# Patient Record
Sex: MALE | Race: WHITE | Employment: OTHER | URBAN - METROPOLITAN AREA
[De-identification: names, ages, dates, MRNs, and addresses within clinical notes are randomized per-mention and may not be internally consistent; named-entity substitution may affect disease eponyms.]

---

## 2017-09-08 PROBLEM — N52.9 ERECTILE DYSFUNCTION: Status: ACTIVE | Noted: 2017-09-08

## 2017-09-08 PROBLEM — R35.0 FREQUENCY OF URINATION: Status: ACTIVE | Noted: 2017-09-08

## 2020-10-15 ENCOUNTER — HOSPITAL ENCOUNTER (OUTPATIENT)
Age: 75
Setting detail: SPECIMEN
Discharge: HOME | End: 2020-10-15
Payer: MEDICARE

## 2020-10-15 LAB
ANION GAP SERPL CALCULATED.3IONS-SCNC: 10 MMOL/L (ref 9–17)
BUN BLDV-MCNC: 22 MG/DL (ref 8–23)
BUN/CREAT BLD: ABNORMAL (ref 9–20)
CALCIUM SERPL-MCNC: 8.3 MG/DL (ref 8.6–10.4)
CHLORIDE BLD-SCNC: 113 MMOL/L (ref 98–107)
CO2: 22 MMOL/L (ref 20–31)
CREAT SERPL-MCNC: 1.04 MG/DL (ref 0.7–1.2)
GFR AFRICAN AMERICAN: >60 ML/MIN
GFR NON-AFRICAN AMERICAN: >60 ML/MIN
GFR SERPL CREATININE-BSD FRML MDRD: ABNORMAL ML/MIN/{1.73_M2}
GFR SERPL CREATININE-BSD FRML MDRD: ABNORMAL ML/MIN/{1.73_M2}
GLUCOSE BLD-MCNC: 335 MG/DL (ref 70–99)
MAGNESIUM: 2.2 MG/DL (ref 1.6–2.6)
POTASSIUM SERPL-SCNC: 4.2 MMOL/L (ref 3.7–5.3)
SODIUM BLD-SCNC: 145 MMOL/L (ref 135–144)

## 2020-10-15 PROCEDURE — 80048 BASIC METABOLIC PNL TOTAL CA: CPT

## 2020-10-15 PROCEDURE — 36415 COLL VENOUS BLD VENIPUNCTURE: CPT

## 2020-10-15 PROCEDURE — 83735 ASSAY OF MAGNESIUM: CPT

## 2020-10-15 PROCEDURE — P9603 ONE-WAY ALLOW PRORATED MILES: HCPCS

## 2020-11-16 PROBLEM — R31.0 GROSS HEMATURIA: Status: ACTIVE | Noted: 2020-11-16

## 2020-11-16 PROBLEM — R35.1 NOCTURIA: Status: ACTIVE | Noted: 2020-11-16

## 2020-11-16 PROBLEM — N32.81 URGENCY-FREQUENCY SYNDROME: Status: ACTIVE | Noted: 2020-11-16

## 2020-11-25 PROBLEM — N35.919 URETHRAL STRICTURE: Status: ACTIVE | Noted: 2020-11-25

## 2020-12-06 ENCOUNTER — HOSPITAL ENCOUNTER (OUTPATIENT)
Dept: PREADMISSION TESTING | Age: 75
Setting detail: SPECIMEN
Discharge: HOME | End: 2020-12-10
Payer: MEDICARE

## 2020-12-06 PROCEDURE — U0003 INFECTIOUS AGENT DETECTION BY NUCLEIC ACID (DNA OR RNA); SEVERE ACUTE RESPIRATORY SYNDROME CORONAVIRUS 2 (SARS-COV-2) (CORONAVIRUS DISEASE [COVID-19]), AMPLIFIED PROBE TECHNIQUE, MAKING USE OF HIGH THROUGHPUT TECHNOLOGIES AS DESCRIBED BY CMS-2020-01-R: HCPCS

## 2020-12-07 LAB
SARS-COV-2, RAPID: NORMAL
SARS-COV-2: NORMAL
SARS-COV-2: NOT DETECTED
SOURCE: NORMAL

## 2020-12-08 RX ORDER — MIDODRINE HYDROCHLORIDE 5 MG/1
5 TABLET ORAL 2 TIMES DAILY
COMMUNITY
End: 2021-07-12

## 2020-12-08 NOTE — PROGRESS NOTES
Writer spoke with rep Chetan from [employer], regarding patients procedure on 12/10/2020. Rep does not need to be present, and no intervention needed during procedure.

## 2020-12-10 ENCOUNTER — ANESTHESIA EVENT (OUTPATIENT)
Dept: OPERATING ROOM | Age: 75
End: 2020-12-10
Payer: MEDICARE

## 2020-12-10 ENCOUNTER — ANESTHESIA (OUTPATIENT)
Dept: OPERATING ROOM | Age: 75
End: 2020-12-10
Payer: MEDICARE

## 2020-12-10 ENCOUNTER — HOSPITAL ENCOUNTER (OUTPATIENT)
Age: 75
Setting detail: OUTPATIENT SURGERY
Discharge: HOME | End: 2020-12-10
Attending: SPECIALIST | Admitting: SPECIALIST
Payer: MEDICARE

## 2020-12-10 VITALS — OXYGEN SATURATION: 100 % | DIASTOLIC BLOOD PRESSURE: 71 MMHG | SYSTOLIC BLOOD PRESSURE: 119 MMHG

## 2020-12-10 VITALS
SYSTOLIC BLOOD PRESSURE: 139 MMHG | BODY MASS INDEX: 22.9 KG/M2 | HEART RATE: 66 BPM | RESPIRATION RATE: 17 BRPM | OXYGEN SATURATION: 99 % | HEIGHT: 70 IN | TEMPERATURE: 97.7 F | DIASTOLIC BLOOD PRESSURE: 83 MMHG | WEIGHT: 160 LBS

## 2020-12-10 LAB
GFR NON-AFRICAN AMERICAN: >60 ML/MIN
GFR SERPL CREATININE-BSD FRML MDRD: >60 ML/MIN
GFR SERPL CREATININE-BSD FRML MDRD: NORMAL ML/MIN/{1.73_M2}
GLUCOSE BLD-MCNC: 115 MG/DL (ref 75–110)
GLUCOSE BLD-MCNC: 128 MG/DL (ref 74–100)
POC CREATININE: 0.84 MG/DL (ref 0.51–1.19)

## 2020-12-10 PROCEDURE — 2580000003 HC RX 258: Performed by: SPECIALIST

## 2020-12-10 PROCEDURE — 3700000001 HC ADD 15 MINUTES (ANESTHESIA): Performed by: SPECIALIST

## 2020-12-10 PROCEDURE — 2500000003 HC RX 250 WO HCPCS: Performed by: NURSE ANESTHETIST, CERTIFIED REGISTERED

## 2020-12-10 PROCEDURE — 3600000003 HC SURGERY LEVEL 3 BASE: Performed by: SPECIALIST

## 2020-12-10 PROCEDURE — 2580000003 HC RX 258: Performed by: ANESTHESIOLOGY

## 2020-12-10 PROCEDURE — 6360000002 HC RX W HCPCS: Performed by: ANESTHESIOLOGY

## 2020-12-10 PROCEDURE — 7100000040 HC SPAR PHASE II RECOVERY - FIRST 15 MIN: Performed by: SPECIALIST

## 2020-12-10 PROCEDURE — 2709999900 HC NON-CHARGEABLE SUPPLY: Performed by: SPECIALIST

## 2020-12-10 PROCEDURE — 6360000002 HC RX W HCPCS: Performed by: NURSE ANESTHETIST, CERTIFIED REGISTERED

## 2020-12-10 PROCEDURE — 82565 ASSAY OF CREATININE: CPT

## 2020-12-10 PROCEDURE — 82947 ASSAY GLUCOSE BLOOD QUANT: CPT

## 2020-12-10 PROCEDURE — 3600000013 HC SURGERY LEVEL 3 ADDTL 15MIN: Performed by: SPECIALIST

## 2020-12-10 PROCEDURE — 7100000041 HC SPAR PHASE II RECOVERY - ADDTL 15 MIN: Performed by: SPECIALIST

## 2020-12-10 PROCEDURE — 6360000002 HC RX W HCPCS: Performed by: SPECIALIST

## 2020-12-10 PROCEDURE — 3700000000 HC ANESTHESIA ATTENDED CARE: Performed by: SPECIALIST

## 2020-12-10 RX ORDER — PROPOFOL 10 MG/ML
INJECTION, EMULSION INTRAVENOUS PRN
Status: DISCONTINUED | OUTPATIENT
Start: 2020-12-10 | End: 2020-12-10 | Stop reason: SDUPTHER

## 2020-12-10 RX ORDER — CEPHALEXIN 500 MG/1
500 CAPSULE ORAL 3 TIMES DAILY
Qty: 9 CAPSULE | Refills: 0 | Status: SHIPPED | OUTPATIENT
Start: 2020-12-10 | End: 2020-12-13

## 2020-12-10 RX ORDER — MEPERIDINE HYDROCHLORIDE 50 MG/ML
12.5 INJECTION INTRAMUSCULAR; INTRAVENOUS; SUBCUTANEOUS EVERY 5 MIN PRN
Status: DISCONTINUED | OUTPATIENT
Start: 2020-12-10 | End: 2020-12-10 | Stop reason: HOSPADM

## 2020-12-10 RX ORDER — SERTRALINE HYDROCHLORIDE 25 MG/1
25 TABLET, FILM COATED ORAL DAILY
COMMUNITY
End: 2021-08-20

## 2020-12-10 RX ORDER — ONDANSETRON 2 MG/ML
4 INJECTION INTRAMUSCULAR; INTRAVENOUS
Status: DISCONTINUED | OUTPATIENT
Start: 2020-12-10 | End: 2020-12-10 | Stop reason: HOSPADM

## 2020-12-10 RX ORDER — PROPOFOL 10 MG/ML
INJECTION, EMULSION INTRAVENOUS CONTINUOUS PRN
Status: DISCONTINUED | OUTPATIENT
Start: 2020-12-10 | End: 2020-12-10 | Stop reason: SDUPTHER

## 2020-12-10 RX ORDER — MIDAZOLAM HYDROCHLORIDE 2 MG/2ML
1 INJECTION, SOLUTION INTRAMUSCULAR; INTRAVENOUS EVERY 10 MIN PRN
Status: DISCONTINUED | OUTPATIENT
Start: 2020-12-10 | End: 2020-12-10 | Stop reason: HOSPADM

## 2020-12-10 RX ORDER — FENTANYL CITRATE 50 UG/ML
50 INJECTION, SOLUTION INTRAMUSCULAR; INTRAVENOUS EVERY 5 MIN PRN
Status: DISCONTINUED | OUTPATIENT
Start: 2020-12-10 | End: 2020-12-10 | Stop reason: HOSPADM

## 2020-12-10 RX ORDER — FENTANYL CITRATE 50 UG/ML
25 INJECTION, SOLUTION INTRAMUSCULAR; INTRAVENOUS EVERY 5 MIN PRN
Status: DISCONTINUED | OUTPATIENT
Start: 2020-12-10 | End: 2020-12-10 | Stop reason: HOSPADM

## 2020-12-10 RX ORDER — ASPIRIN 81 MG/1
81 TABLET ORAL DAILY
Status: ON HOLD | COMMUNITY
End: 2020-12-10 | Stop reason: HOSPADM

## 2020-12-10 RX ORDER — LIDOCAINE HYDROCHLORIDE 10 MG/ML
INJECTION, SOLUTION EPIDURAL; INFILTRATION; INTRACAUDAL; PERINEURAL PRN
Status: DISCONTINUED | OUTPATIENT
Start: 2020-12-10 | End: 2020-12-10 | Stop reason: SDUPTHER

## 2020-12-10 RX ORDER — MAGNESIUM HYDROXIDE 1200 MG/15ML
LIQUID ORAL PRN
Status: DISCONTINUED | OUTPATIENT
Start: 2020-12-10 | End: 2020-12-10 | Stop reason: ALTCHOICE

## 2020-12-10 RX ORDER — SODIUM CHLORIDE, SODIUM LACTATE, POTASSIUM CHLORIDE, CALCIUM CHLORIDE 600; 310; 30; 20 MG/100ML; MG/100ML; MG/100ML; MG/100ML
INJECTION, SOLUTION INTRAVENOUS CONTINUOUS
Status: DISCONTINUED | OUTPATIENT
Start: 2020-12-10 | End: 2020-12-10 | Stop reason: HOSPADM

## 2020-12-10 RX ADMIN — PROPOFOL 100 MCG/KG/MIN: 10 INJECTION, EMULSION INTRAVENOUS at 12:32

## 2020-12-10 RX ADMIN — LIDOCAINE HYDROCHLORIDE 50 MG: 10 INJECTION, SOLUTION EPIDURAL; INFILTRATION; INTRACAUDAL; PERINEURAL at 12:32

## 2020-12-10 RX ADMIN — SODIUM CHLORIDE, POTASSIUM CHLORIDE, SODIUM LACTATE AND CALCIUM CHLORIDE: 600; 310; 30; 20 INJECTION, SOLUTION INTRAVENOUS at 12:22

## 2020-12-10 RX ADMIN — CEFAZOLIN SODIUM 2 G: 10 INJECTION, POWDER, FOR SOLUTION INTRAVENOUS at 12:36

## 2020-12-10 RX ADMIN — FENTANYL CITRATE 50 MCG: 50 INJECTION, SOLUTION INTRAMUSCULAR; INTRAVENOUS at 13:09

## 2020-12-10 RX ADMIN — PROPOFOL 60 MG: 10 INJECTION, EMULSION INTRAVENOUS at 12:32

## 2020-12-10 RX ADMIN — FENTANYL CITRATE 50 MCG: 50 INJECTION, SOLUTION INTRAMUSCULAR; INTRAVENOUS at 13:14

## 2020-12-10 ASSESSMENT — PULMONARY FUNCTION TESTS
PIF_VALUE: 0
PIF_VALUE: 1
PIF_VALUE: 0
PIF_VALUE: 1
PIF_VALUE: 0

## 2020-12-10 ASSESSMENT — PAIN SCALES - GENERAL
PAINLEVEL_OUTOF10: 1
PAINLEVEL_OUTOF10: 3
PAINLEVEL_OUTOF10: 7
PAINLEVEL_OUTOF10: 7
PAINLEVEL_OUTOF10: 0

## 2020-12-10 ASSESSMENT — PAIN - FUNCTIONAL ASSESSMENT: PAIN_FUNCTIONAL_ASSESSMENT: 0-10

## 2020-12-10 NOTE — BRIEF OP NOTE
Brief Postoperative Note      Patient: Azra Ortiz  YOB: 1945  MRN: [id number]    Date of Procedure: 12/10/2020    Pre-Op Diagnosis: URETHRAL MEATAL STENOSIS, GROSS HEMATURIA    Post-Op Diagnosis: Same       Procedure(s):  CYSTOSCOPY WITH DILATATION, SAIRA SOUNDS    Surgeon(s):  Mayank Miner MD    Assistant:  * No surgical staff found *    Anesthesia: Monitor Anesthesia Care    Estimated Blood Loss (mL): Minimal    Complications: None    Specimens:   * No specimens in log *    Implants:  * No implants in log *      Drains: * No LDAs found *    Findings: Meatal stenosis     Electronically signed by Abel Aden MD on 12/10/2020 at 12:53 PM

## 2020-12-10 NOTE — ANESTHESIA PRE PROCEDURE
Department of Anesthesiology  Preprocedure Note       Name:  Nadia Lehman   Age:  70 y.o.  :  1945                                          MRN:  [id number]         Date:  12/10/2020      Surgeon: Jass Linares):  Manuel Arciniega MD    Procedure: Procedure(s):  CYSTOSCOPY WITH DILATATION, SAIRA SOUNDS    Medications prior to admission:   Prior to Admission medications    Medication Sig Start Date End Date Taking?  Authorizing Provider   sertraline (ZOLOFT) 25 MG tablet Take 25 mg by mouth daily   Yes Historical Provider, MD   midodrine (PROAMATINE) 5 MG tablet Take 5 mg by mouth 2 times daily   Yes Historical Provider, MD   metFORMIN (GLUCOPHAGE) 500 MG tablet Take 500 mg by mouth daily (with breakfast)   Yes Historical Provider, MD   insulin glargine (LANTUS;BASAGLAR) 100 UNIT/ML injection pen Inject 20 Units into the skin daily 10/25/20  Yes Historical Provider, MD   insulin lispro, 1 Unit Dial, 100 UNIT/ML SOPN Inject 2-16 Units into the skin 10/25/20  Yes Historical Provider, MD   magnesium hydroxide (MILK OF MAGNESIA) 400 MG/5ML suspension Take 30 mLs by mouth nightly as needed   Yes Historical Provider, MD   melatonin 3 MG TABS tablet Take 10 mg by mouth nightly as needed    Yes Historical Provider, MD   montelukast (SINGULAIR) 10 MG tablet Take 10 mg by mouth nightly   Yes Historical Provider, MD   rivaroxaban (XARELTO) 20 MG TABS tablet Take 20 mg by mouth daily 20  Yes Historical Provider, MD   alfuzosin (UROXATRAL) 10 MG extended release tablet Take 1 tablet by mouth daily  Patient taking differently: Take 800 mg by mouth daily  20  Yes Manuel Arciniega MD   ARMOUR THYROID 120 MG tablet  10/19/16  Yes Historical Provider, MD   atorvastatin (LIPITOR) 80 MG tablet Take 80 mg by mouth daily   Yes Historical Provider, MD   Multiple Vitamins-Minerals (THERAPEUTIC MULTIVITAMIN-MINERALS) tablet Take 1 tablet by mouth daily   Yes Historical Provider, MD   cyclobenzaprine (FLEXERIL) 10 MG tablet Take 1 tablet by mouth every 8 hours as needed for Muscle spasms 6/9/15  Yes Nhung Villanueva MD   aspirin 81 MG EC tablet Take 81 mg by mouth daily    Historical Provider, MD   Chromium Picolinate 200 MCG CAPS Take 200 mg by mouth daily    Historical Provider, MD   Cinnamon 500 MG CAPS Take 500 mg by mouth daily    Historical Provider, MD       Current medications:    Current Facility-Administered Medications   Medication Dose Route Frequency Provider Last Rate Last Dose    ceFAZolin (ANCEF) 2 g in dextrose 5 % 50 mL IVPB  2 g Intravenous Once Cookie Spatz, MD        lactated ringers infusion   Intravenous Continuous Vinh Decker  mL/hr at 12/10/20 1222      midazolam PF (VERSED) injection 1 mg  1 mg Intravenous Q10 Min PRN Vinh Decker MD           Allergies:  No Known Allergies    Problem List:    Patient Active Problem List   Diagnosis Code    Type 2 diabetes mellitus without complication ([facility] [address] A27.8    CAD (coronary artery disease) I25.10    Hypercholesteremia E78.00    Chronic low back pain M54.5, G89.29    BPH with obstruction/lower urinary tract symptoms N40.1, N13.8    Frequency of urination R35.0    Erectile dysfunction N52.9    Gross hematuria R31.0    Nocturia R35.1    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    Urethral stricture N35.919       Past Medical History:        Diagnosis Date    Arthritis     BPH (benign prostatic hypertrophy) with urinary obstruction     CAD (coronary artery disease) 10/18/2012    CHF (congestive heart failure) (HCC)     Chronic low back pain 10/18/2012    Heart attack ([facility] [address]     History of spinal cord injury     Hypercholesteremia 10/18/2012    Hypotension     Osteoarthritis     Rheumatic fever     Sleep apnea     Type II or unspecified type diabetes mellitus without mention of complication, not stated as uncontrolled 10/18/2012    Dr. Whit Almeida Wears glasses        Past Surgical History:        Procedure Laterality Date    BACK SURGERY  2013    CORONARY ANGIOPLASTY WITH STENT PLACEMENT      PACEMAKER INSERTION  2016    [employer], AICD, S-ICD system, subq, patient unable to find card    [facility] SURGERY      lumbar  x 2    SPINE SURGERY  2013    lumbar     TONSILLECTOMY         Social History:    Social History     Tobacco Use    Smoking status: Former Smoker     Packs/day: 10.00     Years: 0.50     Pack years: 5.00     Types: Cigarettes     Last attempt to quit: 1993     Years since quittin.5    Smokeless tobacco: Never Used   Substance Use Topics    Alcohol use: Not Currently                                Counseling given: Not Answered      Vital Signs (Current):   Vitals:    20 1555 12/10/20 1143 12/10/20 1208   BP:   133/75   Pulse:   68   Resp:   16   Temp:   97.5 °F (36.4 °C)   TempSrc:   Temporal   SpO2:   100%   Weight: 160 lb (72.6 kg) 160 lb (72.6 kg)    Height: 5' 11\" (1.803 m) 5' 10\" (1.778 m)                                               BP Readings from Last 3 Encounters:   12/10/20 133/75   20 121/67   17 140/83       NPO Status: Time of last liquid consumption:                         Time of last solid consumption:                         Date of last liquid consumption: 20                        Date of last solid food consumption: 20    BMI:   Wt Readings from Last 3 Encounters:   12/10/20 160 lb (72.6 kg)   20 159 lb (72.1 kg)   20 159 lb (72.1 kg)     Body mass index is 22.96 kg/m².     CBC:   Lab Results   Component Value Date    WBC 7.5 06/10/2015    RBC 4.78 06/10/2015    HGB 14.4 06/10/2015    HCT 43.1 06/10/2015    MCV 90.2 06/10/2015    RDW 13.0 06/10/2015     06/10/2015       CMP:   Lab Results   Component Value Date     10/15/2020    K 4.2 10/15/2020     10/15/2020    CO2 22 10/15/2020    BUN 22 10/15/2020    CREATININE 1.04 10/15/2020    GFRAA >60 10/15/2020    LABGLOM >60

## 2020-12-10 NOTE — LETTER
[facility] Raghavendra Fulton MD [address] [facility], [address] [place] [address],[address]  [place], [address] [facility]  P: [phone] / F: [phone]    Brief Postoperative Note  Surgical Facility: [facility]   12/10/20    Axel Dover  [id number]  1945    Dear Yancy Greco,    I've enclosed a Brief Op note on a procedure performed on your patient, Axel Dover today. Pre-operative Diagnosis: gross hematuria and urethral meatal stenosis   Post-operative Diagnosis: same    Procedure: Cystoscopy and dilation with Annella Chamber sounds to 26F    Anesthesia: MAC    Surgeon: Raghavendra Fulton; Resident: Maria Elena Pérez    Findings: Tight urethral meatal stenosis but Today's cystoscopy was negative for any intravesical pathology. Plan: Follow up on Monday to remove indwelling alex catheter and teach intermittent cath to maintain patency of urethra to prevent stricture recurrence. Thank you for allowing me to participate in the care of this patient. I will keep you updated on this patient's follow up and I look forward to serving you and your patients again in the future.         Electronically signed by Angela Henry MD, FACS

## 2020-12-10 NOTE — H&P
Omaira Nunez MD [facility]     Urology H&P     Patient:  Netta Choi  YOB: 1945  Date: 12/10/2020      HISTORY OF PRESENT ILLNESS:   The patient is a 70 y.o. male who presents today for evaluation of the following problems:   LUTS:  Patient is here today for lower urinary tract symptoms which started several year(s) ago. Recently the urinary symptoms are: show no change  Current medical Rx for BPH/LUTS: Alfuzosin 10 mg po qd for BPH symptoms. Lower urinary tract symptoms: urgency, frequency and nocturia, 2 times per night   Associated Symptoms: No dysuria, gross hematuria. Pain Severity: Pain Score:   0 - No pain/10     (Patient's old records have been requested, reviewed and summarized in today's note.)     Summary of old records:  BPH: better on Rapaflo 8 mg qd; switched to alfuzosin 10 mg qd for cost; 11/25/20 tight urethral meatal stenosis  Frequency every 2-3 hrs; told to better control blood sugar  ED: Cialis 5 mg prn; given generic sildenafil 20 mg (1-5 tabs) prn ED for cost  Gross hematuria on Xarelto: 10/10/20 KUB U/S neg, needs a cysto     Additional History: The patient presents with persistent lower urinary tract symptoms despite Alfuzosin 10 mg po qd for BPH symptoms.      Procedures Today:      PROSTATE U/S  Surgeon: Jacob Moura.  Chikis Nunez MD, FACS  11/25/20  Indications for exam: BPH  Transrectal Ultrasound Findings:  Prostate volume: 30.63 grams  Findings: no hypoechogenic areas suggestive of cancer seen  Prostate median lobe: unremarkable  Seminal vesicle: normal  Bladder exam: normal     Last several PSA's:        Lab Results   Component Value Date     PSA 1.42 09/04/2014     PSA 0.59 01/19/2013      Last total testosterone:        Lab Results   Component Value Date     TESTOSTERONE 261 01/19/2013      Urinalysis today:  No results found for this visit on 11/25/20.     AUA Symptom Score (11/25/2020):  INCOMPLETE EMPTYING: How often have you had the sensation of not emptying your bladder?: Less than 1 to 5 times  FREQUENCY: How often do you have to urinate less than every two hours?: About Half the time  INTERMITTENCY: How often have you found you stopped and started again several times when you urinated?: Less than Half the time  URGENCY: How often have you found it difficult to postpone urination?: About Half the time  WEAK STREAM: How often have you had a weak urinary stream?: Less than 1 to 5 times  STRAINING: How often have you had to strain to start  urination?: Not at all  NOCTURIA: How many times did you typically get up at night to uriniate?: 2 Times  TOTAL I-PSS SCORE[de-identified] 12  How would you feel if you were to spend the rest of your life with your urinary condition?: Mixe     Last BUN and creatinine:        Lab Results   Component Value Date     BUN 22 10/15/2020            Lab Results   Component Value Date     CREATININE 1.04 10/15/2020         Additional Lab/Culture results: none     Imaging Reviewed during this Office Visit: none  (results were independently reviewed by physician and radiology report verified)     PAST MEDICAL, FAMILY AND SOCIAL HISTORY:  Past Medical History        Past Medical History:   Diagnosis Date    Allergic rhinitis      Arthritis      BPH (benign prostatic hypertrophy) with urinary obstruction      CAD (coronary artery disease) 10/18/2012    Caffeine use       1 coffee/day    CHF (congestive heart failure) ([facility] [address]      Chronic low back pain 10/18/2012    Erectile dysfunction      Heart attack ([facility] [address]      History of spinal cord injury      Hypercholesteremia 10/18/2012    Osteoarthritis      Rheumatic fever      Sleep apnea      Type II or unspecified type diabetes mellitus without mention of complication, not stated as uncontrolled 10/18/2012        Past Surgical History   Past Surgical History:   Procedure Laterality Date    BACK SURGERY   7/2013    CORONARY ANGIOPLASTY WITH STENT PLACEMENT        PACEMAKER INSERTION        Types: Cigarettes    Last attempt to quit: 1993    Years since quittin.5   Smokeless Tobacco Never Used      (If patient a smoker, smoking cessation counseling offered)   Social History          Substance and Sexual Activity   Alcohol Use Not Currently         REVIEW OF SYSTEMS (obtained by ancillary medical staff):  Level 2  Constitutional: positive for  fatigue  Level 3  Respiratory: positive for  dyspnea  Level 4-5  Eyes: negative  Neurological: positive for headaches  Gastrointestinal: Positive for constipation  Cardiovascular: negative  Skin: negative   Musculoskeletal: positive for  myalgias and pain  Ears/Nose/Throat: positive for sneezing  Psychological: positive for anxiety     Physical Exam:    This a 70 y.o. male   Ht 5' 11\" (1.803 m)   Wt 160 lb (72.6 kg)   BMI 22.32 kg/m²    Patient alert and oriented and in no apparent distress.    Lungs CTA  Heart R3 without murmur  Abdomen: Soft NT ND     Assessment and Plan   1. Other stricture of urethral meatus in male    2. BPH with obstruction/lower urinary tract symptoms    3. Gross hematuria    4. Frequency of urination          Plan:    Cystoscopy and urethral dilation with Naman sounds under MAC.     Rosalina Mcnally M.D. 11:33 AM 12/10/2020

## 2020-12-10 NOTE — OP NOTE
Operative Note      Patient: Netta Choi  YOB: 1945  MRN: [id number]    Date of Procedure: 12/10/2020    Pre-Op Diagnosis: MEATAL STENOSIS, GROSS HEMATURIA, LUTS    Post-Op Diagnosis: Same       Procedure(s):  CYSTOSCOPY WITH DILATATION, SAIRA SOUNDS    Surgeon(s):  Diego Lara MD    Assistant:   Resident: Charlanne Sicard, MD    Anesthesia: Monitor Anesthesia Care    Estimated Blood Loss (mL): Minimal    Complications: None    Specimens:   * No specimens in log *    Implants:  * No implants in log *      Drains: * No LDAs found *    Findings: Meatal stenosis    Indications:  Patient is a pleasant 81-year-old  male who presents with meatal stenosis and lower urinary tract symptoms despite medical therapy as well as gross hematuria. He presents today for cystoscopy with urethral dilation for evaluation. Risk, benefits, alternatives were discussed with the patient and he elected to proceed. Informed consent was obtained. Detailed Description of Procedure:   Patient was taken to the operating room and placed on the operating room table. MAC anesthesia was induced appropriately and antibiotics consisting of Ancef 2 g IV were administered. Patient was prepped and draped in usual sterile fashion after being placed in dorsolithotomy position. We began by noting that the patient had significant meatal stenosis. We proceeded by dilating the meatus using [employer] sounds from [place] to [address]. We were then able to place a 22 [place] cystoscope with 30 degree lens per urethra and advancing to the bladder. The rest of the anterior urethra was normal and posterior urethra was normal as well. Prostate was noted to be nonobstructing. We advanced into the bladder and pan cystoscopy revealed no gross abnormalities. Once were satisfied, scope was removed and a [address] [place] coudé Rob catheter was placed with 10 mL of sterile water placed in balloon.   Procedure was then terminated after catheter was hooked to dependent drainage. Patient tolerated procedure well without complication was taken to PACU in good and stable condition. Dr. Rimma Hou was present and scrubbed for the duration of the procedure.     Electronically signed by Corinna Roper MD on 12/10/2020 at 12:54 PM

## 2020-12-10 NOTE — ANESTHESIA POSTPROCEDURE EVALUATION
Department of Anesthesiology  Postprocedure Note    Patient: Nadia Lehman  MRN: [id number]  YOB: 1945  Date of evaluation: 12/10/2020  Time:  1:45 PM     Procedure Summary     Date:  12/10/20 Room / Location:  [address]    Anesthesia Start:  1728 Anesthesia Stop:  1300    Procedure:  CYSTOSCOPY WITH DILATATION, SAIRA SOUNDS (N/A ) Diagnosis:  (URETHRAL MEATAL STENOSIS, GROSS HEMATURIA)    Surgeon:  Manuel Arciniega MD Responsible Provider:  Luis E Holliday MD    Anesthesia Type:  MAC ASA Status:  3          Anesthesia Type: MAC    Genie Phase I:      Genie Phase II: Genie Score: 10    Last vitals: Reviewed and per EMR flowsheets.      POST-OP ANESTHESIA NOTE       /83   Pulse 66   Temp 97.7 °F (36.5 °C) (Temporal)   Resp 17   Ht 5' 10\" (1.778 m)   Wt 160 lb (72.6 kg)   SpO2 99%   BMI 22.96 kg/m²    Pain Assessment: 0-10  Pain Level: 1           Anesthesia Post Evaluation    Patient location during evaluation: PACU  Patient participation: complete - patient participated  Level of consciousness: awake  Pain score: 1  Airway patency: patent  Nausea & Vomiting: no vomiting and no nausea  Complications: no  Cardiovascular status: hemodynamically stable  Respiratory status: acceptable  Hydration status: stable

## (undated) DEVICE — DRAPE,REIN 53X77,STERILE: Brand: MEDLINE

## (undated) DEVICE — PACK PROCEDURE SURG CYSTO SVMMC LF

## (undated) DEVICE — PROTECTOR ULN NRV PUR FOAM HK LOOP STRP ANATOMICALLY

## (undated) DEVICE — Z INACTIVE USE 2635503 SOLUTION IRRIG 3000ML ST H2O USP UROMATIC PLAS CONT